# Patient Record
Sex: MALE | Race: WHITE | Employment: FULL TIME | URBAN - METROPOLITAN AREA
[De-identification: names, ages, dates, MRNs, and addresses within clinical notes are randomized per-mention and may not be internally consistent; named-entity substitution may affect disease eponyms.]

---

## 2017-01-05 ENCOUNTER — OFFICE VISIT (OUTPATIENT)
Dept: FAMILY MEDICINE CLINIC | Facility: CLINIC | Age: 57
End: 2017-01-05

## 2017-01-05 VITALS
OXYGEN SATURATION: 98 % | HEART RATE: 58 BPM | SYSTOLIC BLOOD PRESSURE: 122 MMHG | TEMPERATURE: 98 F | RESPIRATION RATE: 18 BRPM | DIASTOLIC BLOOD PRESSURE: 78 MMHG

## 2017-01-05 DIAGNOSIS — J01.00 ACUTE MAXILLARY SINUSITIS, RECURRENCE NOT SPECIFIED: Primary | ICD-10-CM

## 2017-01-05 DIAGNOSIS — R05.9 COUGH: ICD-10-CM

## 2017-01-05 PROCEDURE — 99213 OFFICE O/P EST LOW 20 MIN: CPT | Performed by: NURSE PRACTITIONER

## 2017-01-05 RX ORDER — AMOXICILLIN AND CLAVULANATE POTASSIUM 875; 125 MG/1; MG/1
1 TABLET, FILM COATED ORAL 2 TIMES DAILY
Qty: 20 TABLET | Refills: 0 | Status: SHIPPED | OUTPATIENT
Start: 2017-01-05 | End: 2017-01-15

## 2017-01-05 RX ORDER — CODEINE PHOSPHATE AND GUAIFENESIN 10; 100 MG/5ML; MG/5ML
5 SOLUTION ORAL EVERY 6 HOURS PRN
Qty: 120 ML | Refills: 0 | Status: SHIPPED | OUTPATIENT
Start: 2017-01-05 | End: 2017-01-26 | Stop reason: ALTCHOICE

## 2017-01-05 NOTE — PATIENT INSTRUCTIONS
Acute Sinusitis    Acute sinusitis is irritation and swelling of the sinuses. It is usually caused by a viral infection after a common cold. Your doctor can help you find relief. What is acute sinusitis?   Sinuses are air-filled spaces in the skull behin © 4758-9979 [address], [address] [place]. All rights reserved. This information is not intended as a substitute for professional medical care. Always follow your healthcare professional's instructions.

## 2017-01-05 NOTE — PROGRESS NOTES
CHIEF COMPLAINT:   Patient presents with:  Sinus Problem: x2 weeks      HPI:   Kim Jaramillo is a 61year old male who presents for sinus congestion for  2  weeks. Symptoms have been worsening since onset.  Sinus congestion/pain is described as a pres Levothyroxine Sodium (SYNTHROID, LEVOTHROID) 125 MCG Oral Tab Take 1 tablet (125 mcg total) by mouth once daily.  Disp: 90 tablet Rfl: 3      Past Medical History   Diagnosis Date   • RLS (restless legs syndrome)    • Esophageal reflux    • Sleep apnea    • THROAT: oral mucosa pink, moist. No visible dental caries. Posterior pharynx is mildly erythematous. no exudates. NECK: supple, non-tender  LUNGS: clear to auscultation bilaterally, no  rhonchi.   One scant wheezing to left lower lobe with forced expiratio Sinuses are air-filled spaces in the skull behind the face. They are kept moist and clean by a lining of mucosa. Things such as pollen, smoke, and chemical fumes can irritate the mucosa. It can then swell up.  As a response to irritation, the mucosa makes m © 4219-8342 The [address] [place] [address], [address]. All rights reserved. This information is not intended as a substitute for professional medical care. Always follow your healthcare professional's instructions.             The

## 2017-02-01 PROCEDURE — 83090 ASSAY OF HOMOCYSTEINE: CPT | Performed by: OTHER

## 2017-02-01 PROCEDURE — 36415 COLL VENOUS BLD VENIPUNCTURE: CPT | Performed by: OTHER

## 2017-02-01 PROCEDURE — 82746 ASSAY OF FOLIC ACID SERUM: CPT | Performed by: OTHER

## 2017-02-01 PROCEDURE — 82607 VITAMIN B-12: CPT | Performed by: OTHER

## 2017-02-01 PROCEDURE — 83921 ORGANIC ACID SINGLE QUANT: CPT | Performed by: OTHER

## 2017-03-16 ENCOUNTER — HOSPITAL ENCOUNTER (EMERGENCY)
Facility: HOSPITAL | Age: 57
Discharge: HOME | End: 2017-03-16
Attending: EMERGENCY MEDICINE
Payer: COMMERCIAL

## 2017-03-16 ENCOUNTER — APPOINTMENT (OUTPATIENT)
Dept: MRI IMAGING | Facility: HOSPITAL | Age: 57
End: 2017-03-16
Attending: EMERGENCY MEDICINE
Payer: COMMERCIAL

## 2017-03-16 VITALS
HEART RATE: 69 BPM | RESPIRATION RATE: 16 BRPM | DIASTOLIC BLOOD PRESSURE: 84 MMHG | WEIGHT: 224.88 LBS | OXYGEN SATURATION: 99 % | BODY MASS INDEX: 30.46 KG/M2 | HEIGHT: 72 IN | SYSTOLIC BLOOD PRESSURE: 136 MMHG | TEMPERATURE: 98 F

## 2017-03-16 DIAGNOSIS — G51.0 BELL'S PALSY: Primary | ICD-10-CM

## 2017-03-16 LAB
APTT PPP: 27.1 SECONDS (ref 25–34)
BASOPHILS # BLD AUTO: 0.04 X10(3) UL (ref 0–0.1)
BASOPHILS NFR BLD AUTO: 0.7 %
BUN BLD-MCNC: 14 MG/DL (ref 8–20)
CALCIUM BLD-MCNC: 9.6 MG/DL (ref 8.3–10.3)
CHLORIDE: 109 MMOL/L (ref 101–111)
CO2: 28 MMOL/L (ref 22–32)
CREAT BLD-MCNC: 1.08 MG/DL (ref 0.7–1.3)
EOSINOPHIL # BLD AUTO: 0.16 X10(3) UL (ref 0–0.3)
EOSINOPHIL NFR BLD AUTO: 2.9 %
ERYTHROCYTE [DISTWIDTH] IN BLOOD BY AUTOMATED COUNT: 13.3 % (ref 11.5–16)
GLUCOSE BLD-MCNC: 132 MG/DL (ref 70–99)
HCT VFR BLD AUTO: 41.6 % (ref 37–53)
HGB BLD-MCNC: 14.1 G/DL (ref 13–17)
IMMATURE GRANULOCYTE COUNT: 0.01 X10(3) UL (ref 0–1)
IMMATURE GRANULOCYTE RATIO %: 0.2 %
INR BLD: 0.99 (ref 0.89–1.11)
LYMPHOCYTES # BLD AUTO: 1.92 X10(3) UL (ref 0.9–4)
LYMPHOCYTES NFR BLD AUTO: 34.8 %
MCH RBC QN AUTO: 28.4 PG (ref 27–33.2)
MCHC RBC AUTO-ENTMCNC: 33.9 G/DL (ref 31–37)
MCV RBC AUTO: 83.7 FL (ref 80–99)
MONOCYTES # BLD AUTO: 0.39 X10(3) UL (ref 0.1–0.6)
MONOCYTES NFR BLD AUTO: 7.1 %
NEUTROPHIL ABS PRELIM: 3 X10 (3) UL (ref 1.3–6.7)
NEUTROPHILS # BLD AUTO: 3 X10(3) UL (ref 1.3–6.7)
NEUTROPHILS NFR BLD AUTO: 54.3 %
PLATELET # BLD AUTO: 120 10(3)UL (ref 150–450)
POTASSIUM SERPL-SCNC: 3.9 MMOL/L (ref 3.6–5.1)
PSA SERPL DL<=0.01 NG/ML-MCNC: 13.1 SECONDS (ref 12–14.3)
RBC # BLD AUTO: 4.97 X10(6)UL (ref 4.3–5.7)
RED CELL DISTRIBUTION WIDTH-SD: 40.2 FL (ref 35.1–46.3)
SODIUM SERPL-SCNC: 143 MMOL/L (ref 136–144)
WBC # BLD AUTO: 5.5 X10(3) UL (ref 4–13)

## 2017-03-16 PROCEDURE — 96361 HYDRATE IV INFUSION ADD-ON: CPT

## 2017-03-16 PROCEDURE — A9575 INJ GADOTERATE MEGLUMI 0.1ML: HCPCS | Performed by: EMERGENCY MEDICINE

## 2017-03-16 PROCEDURE — 99284 EMERGENCY DEPT VISIT MOD MDM: CPT

## 2017-03-16 PROCEDURE — 99285 EMERGENCY DEPT VISIT HI MDM: CPT

## 2017-03-16 PROCEDURE — 80048 BASIC METABOLIC PNL TOTAL CA: CPT | Performed by: EMERGENCY MEDICINE

## 2017-03-16 PROCEDURE — 85730 THROMBOPLASTIN TIME PARTIAL: CPT | Performed by: EMERGENCY MEDICINE

## 2017-03-16 PROCEDURE — 85025 COMPLETE CBC W/AUTO DIFF WBC: CPT | Performed by: EMERGENCY MEDICINE

## 2017-03-16 PROCEDURE — 85610 PROTHROMBIN TIME: CPT | Performed by: EMERGENCY MEDICINE

## 2017-03-16 PROCEDURE — 70553 MRI BRAIN STEM W/O & W/DYE: CPT

## 2017-03-16 PROCEDURE — 96360 HYDRATION IV INFUSION INIT: CPT

## 2017-03-16 RX ORDER — SODIUM CHLORIDE 9 MG/ML
1000 INJECTION, SOLUTION INTRAVENOUS ONCE
Status: COMPLETED | OUTPATIENT
Start: 2017-03-16 | End: 2017-03-16

## 2017-03-16 RX ORDER — MINERAL OIL AND PETROLATUM 150; 830 MG/G; MG/G
1 OINTMENT OPHTHALMIC NIGHTLY PRN
Qty: 3.5 G | Refills: 0 | Status: SHIPPED | OUTPATIENT
Start: 2017-03-16 | End: 2018-01-29 | Stop reason: ALTCHOICE

## 2017-03-16 RX ORDER — ACYCLOVIR 800 MG/1
400 TABLET ORAL 4 TIMES DAILY
Qty: 14 TABLET | Refills: 0 | Status: SHIPPED | OUTPATIENT
Start: 2017-03-16 | End: 2017-03-23 | Stop reason: ALTCHOICE

## 2017-03-16 RX ORDER — PREDNISONE 20 MG/1
20 TABLET ORAL 2 TIMES DAILY
Qty: 10 TABLET | Refills: 0 | Status: SHIPPED | OUTPATIENT
Start: 2017-03-16 | End: 2017-03-21

## 2017-03-16 NOTE — ED PROVIDER NOTES
Patient Seen in: [facility] Emergency Department    History   Patient presents with:  Bell's Palsy    Stated Complaint: facial numbness    HPI    Patient awoke this morning with a left-sided facial droop.   He also feels like the left side of his fac Ophthalmic Suspension,  Apply 2 drops to eye every 4 (four) hours while awake.    Losartan Potassium 50 MG Oral Tab,     ROPINIROLE HCL ER 8 MG Oral Tablet 24 Hr,  TAKE ONE TABLET BY MOUTH EVERY EVENING   Metoprolol Succinate ER 50 MG Oral Tablet 24 Hr,  Ta Patient answers questions quickly and appropriately. There is an obvious left-sided facial droop.   Sensation in the face is diminished on the left cheek but symmetric on the forehead and neck  Eyes: sclera white, conjunctiva pink and moist, midrange pupil units/mL. CBC WITH DIFFERENTIAL WITH PLATELET    Narrative: The following orders were created for panel order CBC WITH DIFFERENTIAL WITH PLATELET.   Procedure                               Abnormality         Status                     --------- needed. Qty: 3.5 g Refills: 0    predniSONE 20 MG Oral Tab  Take 1 tablet (20 mg total) by mouth 2 (two) times daily.   Qty: 10 tablet Refills: 0

## 2017-03-16 NOTE — ED INITIAL ASSESSMENT (HPI)
Pt woke with left sided facial droop with numbness. Pt denies any unilateral weakness. Equal strong hand grasps.

## 2017-07-05 PROBLEM — H25.813 COMBINED FORMS OF AGE-RELATED CATARACT OF BOTH EYES: Status: ACTIVE | Noted: 2017-07-05

## 2017-07-05 PROBLEM — G51.0 LEFT-SIDED BELL'S PALSY: Status: ACTIVE | Noted: 2017-07-05

## 2017-07-05 PROBLEM — S05.02XA CORNEAL ABRASION, LEFT, INITIAL ENCOUNTER: Status: ACTIVE | Noted: 2017-07-05

## 2018-01-29 PROBLEM — S05.02XA CORNEAL ABRASION, LEFT, INITIAL ENCOUNTER: Status: RESOLVED | Noted: 2017-07-05 | Resolved: 2018-01-29

## 2020-06-15 PROBLEM — M76.32 ILIOTIBIAL BAND SYNDROME OF LEFT SIDE: Status: ACTIVE | Noted: 2020-06-15

## 2020-06-15 PROBLEM — M54.10 RADICULAR LEG PAIN: Status: ACTIVE | Noted: 2020-06-15

## 2020-10-02 ENCOUNTER — LAB REQUISITION (OUTPATIENT)
Dept: LAB | Facility: HOSPITAL | Age: 60
End: 2020-10-02
Payer: COMMERCIAL

## 2020-10-02 DIAGNOSIS — Z48.817 ENCOUNTER FOR SURGICAL AFTERCARE FOLLOWING SURGERY ON THE SKIN AND SUBCUTANEOUS TISSUE: ICD-10-CM

## 2020-10-02 PROCEDURE — 87205 SMEAR GRAM STAIN: CPT | Performed by: NURSE PRACTITIONER

## 2020-10-02 PROCEDURE — 87070 CULTURE OTHR SPECIMN AEROBIC: CPT | Performed by: NURSE PRACTITIONER

## 2020-10-02 PROCEDURE — 87186 SC STD MICRODIL/AGAR DIL: CPT | Performed by: NURSE PRACTITIONER

## 2020-10-02 PROCEDURE — 87077 CULTURE AEROBIC IDENTIFY: CPT | Performed by: NURSE PRACTITIONER

## 2021-04-12 DIAGNOSIS — Z23 NEED FOR VACCINATION: ICD-10-CM

## 2021-07-08 PROBLEM — E78.2 MIXED HYPERLIPIDEMIA: Status: ACTIVE | Noted: 2021-07-08

## 2021-07-08 PROBLEM — I25.10 ATHEROSCLEROSIS OF NATIVE CORONARY ARTERY OF NATIVE HEART WITHOUT ANGINA PECTORIS: Status: ACTIVE | Noted: 2021-07-08

## 2023-04-09 NOTE — OPERATIVE REPORT
PATIENT NAME: Clementina Hale  MRN: [id number]  DATE OF OPERATION: 4/11/2023  REFERRING PHYSICIAN: Dr. Jose Blakely  Medications:  Versed 4 mg IVP              Fentanyl 100 mcg IVP  DATE OF LAST COLONOSCOPY:  2012  PREOPERATIVE DIAGNOSIS                colon cancer screening   POSTOPERATIVE DIAGNOSIS:  1. 6 mm cecum polyp removed via cold biopsy forceps. 2. 7 mm ascending colon polyp and a 7 mm transverse colon polyp, both were removed via cold snare. 3. Diverticulosis were scattered throughout the colon. PROCEDURE PERFORMED:               Colonoscopy with polypectomy via cold biopsy forceps and cold snare  Endoscopist:                                             Janeen Elliott MD     PROCEDURE AND FINDINGS: The patient was placed into the left lateral decubitus after informed consent was obtained. All questions were answered. An updated history and physical were performed and an ASA score of 2 was assigned. Informed consent was obtained prior to the procedure, with review of possible complications including bleeding, infection, and missed polyps and or cancer. IV sedation was administered. A digital rectal exam was performed and was normal.  The video adult colonoscope was passed from anus to cecum. The ileocecal valve, appendiceal orfice, hepatic and splenic flexures were all well visualized. The bowel preparation was rated on the Aronchick bowel prep scale as 1. (1 - excellent > 95% mucosa seen; 2 - good - clear liquid up to 25% of the mucosa, 90% mucosa seen;  3 - fair - semisolid stool not suctioned, but 90% of the mucosa seen; 4 - poor - semisolid stool not suctioned, but < 90% mucosa seen; 5 - inadequate - repeat prep needed) . Findings included a 6 mm cecum polyp removed via cold biopsy forceps. A 7 mm ascending colon polyp and a 7 mm transverse colon polyp, both were removed via cold snare. Diverticulosis were scattered throughout the colon.   On retroflexion of the scope in the anorectum, normal internal hemorrhoids were noted. The scope withdrawal from cecum to anus was 16 minutes. A trained sedation nurse was present to assist in monitoring the patient during the entire length of moderate sedation time. Total moderate sedation time was 24 minutes. RECOMMENDATIONS         1. The patient will be provided with a written summary of today's endoscopic findings. 2. The patient will be notified with biopsy results in 2 - 4 working days. 3. Recommendations regarding repeat colonoscopy will be made once the biopsy results are reviewed.               Ignacio Dai MD, Gastroenterologist  Cc: Dr. Wade Aquino

## 2023-04-11 ENCOUNTER — HOSPITAL ENCOUNTER (OUTPATIENT)
Facility: HOSPITAL | Age: 63
Setting detail: HOSPITAL OUTPATIENT SURGERY
Discharge: HOME | End: 2023-04-11
Attending: INTERNAL MEDICINE | Admitting: INTERNAL MEDICINE
Payer: COMMERCIAL

## 2023-04-11 VITALS
WEIGHT: 235 LBS | RESPIRATION RATE: 16 BRPM | OXYGEN SATURATION: 98 % | HEIGHT: 72 IN | SYSTOLIC BLOOD PRESSURE: 126 MMHG | BODY MASS INDEX: 31.83 KG/M2 | DIASTOLIC BLOOD PRESSURE: 81 MMHG | HEART RATE: 68 BPM | TEMPERATURE: 99 F

## 2023-04-11 PROCEDURE — 0DBH8ZX EXCISION OF CECUM, VIA NATURAL OR ARTIFICIAL OPENING ENDOSCOPIC, DIAGNOSTIC: ICD-10-PCS | Performed by: INTERNAL MEDICINE

## 2023-04-11 PROCEDURE — 0DBK8ZX EXCISION OF ASCENDING COLON, VIA NATURAL OR ARTIFICIAL OPENING ENDOSCOPIC, DIAGNOSTIC: ICD-10-PCS | Performed by: INTERNAL MEDICINE

## 2023-04-11 PROCEDURE — 88305 TISSUE EXAM BY PATHOLOGIST: CPT | Performed by: INTERNAL MEDICINE

## 2023-04-11 PROCEDURE — 99153 MOD SED SAME PHYS/QHP EA: CPT | Performed by: INTERNAL MEDICINE

## 2023-04-11 PROCEDURE — 99152 MOD SED SAME PHYS/QHP 5/>YRS: CPT | Performed by: INTERNAL MEDICINE

## 2023-04-11 PROCEDURE — 0DBL8ZX EXCISION OF TRANSVERSE COLON, VIA NATURAL OR ARTIFICIAL OPENING ENDOSCOPIC, DIAGNOSTIC: ICD-10-PCS | Performed by: INTERNAL MEDICINE

## 2023-04-11 RX ORDER — SODIUM CHLORIDE, SODIUM LACTATE, POTASSIUM CHLORIDE, CALCIUM CHLORIDE 600; 310; 30; 20 MG/100ML; MG/100ML; MG/100ML; MG/100ML
INJECTION, SOLUTION INTRAVENOUS CONTINUOUS
Status: DISCONTINUED | OUTPATIENT
Start: 2023-04-11 | End: 2023-04-11

## 2023-04-11 RX ORDER — MIDAZOLAM HYDROCHLORIDE 1 MG/ML
INJECTION INTRAMUSCULAR; INTRAVENOUS
Status: DISCONTINUED | OUTPATIENT
Start: 2023-04-11 | End: 2023-04-11

## 2023-04-11 NOTE — BRIEF OP NOTE
Pre-Operative Diagnosis: COLON CANCER SCRENNING     Post-Operative Diagnosis: polyps, diverticulosis       Procedure Performed:   COLONOSCOPY with cold snare and forcep polypectomy     Surgeon(s) and Role:     * Tayla Puri MD - Primary    Assistant(s):        Surgical Findings: see above     Specimen: see op note     Estimated Blood Loss: No data recorded    Dictation Number:  none    Robin Reyes MD  4/11/2023  1:29 PM

## 2023-04-11 NOTE — DISCHARGE INSTRUCTIONS
ENDOSCOPY DISCHARGE INSTRUCTIONS    Procedure Performed:   Colonoscopy  Endoscopist: No name on file  FINDINGS:   Diverticulosis (pockets in colon that develop with age and lack of fiber intake) and Colon polyp(s) (a growth in the colon)    MEDICATIONS:  You may resume all other medications today    DIET:  General    BIOPSIES:  Biopsies were taken (you will be notified of results in 7-10 days)      ADDITIONAL RECOMMENDATIONS:  Recommendations regarding repeat colonoscopy will be made once the biopsy results are reviewed. Activity for remainder of today:    REST TODAY  DO NOT drive or operate heavy machinery  DO NOT drink any alcoholic beverages  DO NOT sign any legal documents or make any important decisions    After your procedure(s): It is not unusual to feel bloated or gassy . Passing gas and belching is encouraged. Lying on your left side with your knees flexed may relieve the discomfort. A hot pack to the abdomen may also help. After your gastroscopy (upper endoscopy): You may experience a slight sore throat which will subside. Throat lozenges or salt water gargle can be used.     FOLLOW-UP:  Contact the office at [phone] for follow-up appointment if needed or if you develop any of the following:    Severe abdominal pain/discomfort       Excessive bleeding or black tarry stool  Difficulty breathing or swallowing        Persistent nausea,vomiting, or a fever above 100 degrees or chills

## (undated) DEVICE — BIOGUARD CLEANING ADAPTER

## (undated) DEVICE — 3M™ RED DOT™ MONITORING ELECTRODE WITH FOAM TAPE AND STICKY GEL, 50/BAG, 20/CASE, 72/PLT 2570: Brand: RED DOT™

## (undated) DEVICE — FORCEP BIOPSY RJ4 LG CAP W/ND

## (undated) DEVICE — 10FT COMBINED O2 DELIVERY/CO2 MONITORING. FILTER WITH MICROSTREAM TYPE LUER: Brand: DUAL ADULT NASAL CANNULA

## (undated) DEVICE — 1200CC GUARDIAN II: Brand: GUARDIAN

## (undated) DEVICE — TRAP SPEC REMOVAL ETRAP 15CM

## (undated) DEVICE — KIT ENDO ORCAPOD 160/180/190

## (undated) DEVICE — SNARE 9MM 230CM 2.4MM EXACTO

## (undated) DEVICE — ENDOSCOPY PACK - LOWER: Brand: MEDLINE INDUSTRIES, INC.

## (undated) NOTE — ED AVS SNAPSHOT
[facility] Emergency Department    [facility]  [address] [address]    Phone:  [phone]    Fax:  [phone]           Minoo yAala   MRN: [id number]    Department:  [facility] Emergency Department   Date of Visit: IF THERE IS ANY CHANGE OR WORSENING OF YOUR CONDITION, CALL YOUR PRIMARY CARE PHYSICIAN AT ONCE OR RETURN IMMEDIATELY TO THE EMERGENCY DEPARTMENT.     If you have been prescribed any medication(s), please fill your prescription right away and begin taking t

## (undated) NOTE — ED AVS SNAPSHOT
[facility] Emergency Department    [place]  [address] [address]    Phone:  [phone]    Fax:  [phone]           Samy Hernandez   MRN: [id number]    Department:  [facility] Emergency Department   Date of Visit: have any questions regarding your home medications, including potential side effects. Medication List      Notice     You have not been prescribed any medications.               Discharge Instructions       Massage her face a few times a day to primary care or specialist physician will see patients referred from the [facility] Emergency Department. Follow-up care is at the discretion of that Physician.     IF THERE IS ANY CHANGE OR WORSENING OF YOUR CONDITION, CALL YOUR PRIMARY CARE PHYSICIAN harming yourself, contact [facility] at [phone]. - If you don’t have insurance, [employer] [employer] has partnered with [facility] [address] to help you get signed up for insurance coverage.   Patient [place] discharge instructions in [facility] by going to Visits < Admission Summaries. If you've been to the Emergency Department or your doctor's office, you can view your past visit information in [facility] by going to Visits < Visit Summaries. [email or web address] questions?

## (undated) NOTE — MR AVS SNAPSHOT
[facility] [place]  [address] [id number] [phone]               Thank you for choosing us for your health care visit with MOOKIE Marshall. We are glad to serve you and happy to provide you with this summary of your visit.   Ple The doctor may take a sample of mucus to check for bacteria. If you have sinusitis that keeps coming back, you may need imaging tests such as X-rays or CAT scans. This will help your doctor check for a structural problem that may be causing the infection. Medical Group==            Feb 01, 2017 11:00 AM   EXAM-NEW PATIENT with Sarina Jarrell MD   [facility] ([facility])    [address] [address] [address]            Feb 27, 2 Commonly known as:  REQUIP XL           Rosuvastatin Calcium 5 MG Tabs   Take 1 tablet (5 mg total) by mouth nightly. Commonly known as:  CRESTOR           Vitamin C 500 MG Tabs   Take 500 mg by mouth daily.    Commonly known as:  VITAMIN C Make half your plate fruits and vegetables Highly refined, white starches including white bread, rice and pasta   Eat plenty of protein, keep the fat content low Sugars:  sodas and sports drinks, candies and desserts   Eat plenty of low-fat dairy products